# Patient Record
Sex: MALE | Race: BLACK OR AFRICAN AMERICAN | Employment: UNEMPLOYED | URBAN - METROPOLITAN AREA
[De-identification: names, ages, dates, MRNs, and addresses within clinical notes are randomized per-mention and may not be internally consistent; named-entity substitution may affect disease eponyms.]

---

## 2024-07-29 ENCOUNTER — TELEPHONE (OUTPATIENT)
Age: 29
End: 2024-07-29

## 2024-07-29 NOTE — TELEPHONE ENCOUNTER
Patient called looking to schedule surgery for a keloid on his ear, he states that his provider faxed over everything to our office, advised patient that nothing was in his chart, and made a chart for him, he stated he will call back in a few days to see if we received anything

## 2024-07-29 NOTE — TELEPHONE ENCOUNTER
We will wait for his ref. Once its received we will call him to schedule a consult. Thank you so much!

## 2024-07-31 NOTE — TELEPHONE ENCOUNTER
Received call from patient asking if we have received his referral yet.    I looked in the media tab but I did not see that anything had been scanned in.    Patient verbalized understanding

## 2024-08-05 NOTE — TELEPHONE ENCOUNTER
Rec'd call from patient again checking to see if fax was received from referring physician? I apologized and stated that it was not. Confirmed (back) fax number with patient. He states that he'll call referring office again.

## 2024-10-08 ENCOUNTER — OFFICE VISIT (OUTPATIENT)
Dept: PLASTIC SURGERY | Facility: CLINIC | Age: 29
End: 2024-10-08
Payer: COMMERCIAL

## 2024-10-08 VITALS
SYSTOLIC BLOOD PRESSURE: 136 MMHG | DIASTOLIC BLOOD PRESSURE: 96 MMHG | WEIGHT: 266.25 LBS | BODY MASS INDEX: 37.27 KG/M2 | TEMPERATURE: 97.8 F | HEART RATE: 72 BPM | HEIGHT: 71 IN

## 2024-10-08 DIAGNOSIS — L91.0 KELOID: Primary | ICD-10-CM

## 2024-10-08 PROCEDURE — 99203 OFFICE O/P NEW LOW 30 MIN: CPT | Performed by: PHYSICIAN ASSISTANT

## 2024-10-08 NOTE — PROGRESS NOTES
Consult - Plastic Surgery   Juni Kelley 29 y.o. male MRN: [id number]  Unit/Bed#:  Encounter: [id number]      Consults     Assessment:  Pt has a recurrent keloid of his right ear.  Plan:  Excision, adjacent tissue rearrangement    Principal Problem: Recurrent right ear keloid    HPI:   Juni Kelley is a 29 y.o. year old male who presents with a recurrent keloid of his right ear. He had it excised twice, approx 4 years ago and again 2 years ago.  It is beginning to cause him pain.    He is a nonsmoker, has no allergies.      REVIEW OF SYSTEMS    GENERAL/CONSTITUTIONAL: The patient denies fever, fatigue, weakness, weight gain or weight loss.  HEAD, EYES, EARS, NOSE AND THROAT: Eyes - The patient denies pain, redness, loss of vision, double or blurred vision and denies wearing glasses. The patient denies ringing in the ears, nosebleeds sinusitis, post nasal drip. Also denies frequent sore throats, hoarseness, painful swallowing.  CARDIOVASCULAR: The patient denies chest pain, irregular heartbeats, palpitations, shortness of breath, heart murmurs, high blood pressure, cramps in his legs with walking, pain in his feet or toes at night or varicose veins.  RESPIRATORY: The patient denies chronic cough, wheezing or night sweats.  GASTROINTESTINAL: The patient denies decreased appetite, nausea, vomiting, diarrhea, constipation, blood in the stools.  GENITOURINARY: The patient denies difficult urination, pain or burning with urination, blood in the urine.  MUSCULOSKELETAL: The patient denies arm, thigh or calf cramps. No joint or muscle pain. No muscle weakness or tenderness. No joint swelling, neck pain, back pain or major orthopedic injuries.  SKIN AND BREASTS: see hpi The patient denies easy bruising, skin redness, skin rash, hives.  NEUROLOGIC: The patient denies headache, dizziness, fainting, memory loss.  PSYCHIATRIC: The patient denies depression anxiety.  ENDOCRINE: The patient denies intolerance to hot or cold  "temperature, flushing, fingernail changes, increased thirst, increased salt intake or decreased sexual desire.  HEMATOLOGIC/LYMPHATIC: The patient denies anemia, bleeding tendency or clotting tendency.  ALLERGIC/IMMUNOLOGIC: The patient denies rhinitis, asthma, skin sensitivity, latex allergies or sensitivity.      Historical Information   No past medical history on file.  No past surgical history on file.  Social History   Social History     Substance and Sexual Activity   Alcohol Use Yes     Social History     Substance and Sexual Activity   Drug Use Never     Social History     Tobacco Use   Smoking Status Never    Passive exposure: Never   Smokeless Tobacco Never     Family History: No family history on file.    Meds/Allergies   No current outpatient medications on file.       Objective     /96   Pulse 72   Temp 97.8 °F (36.6 °C)   Ht 5' 11\" (1.803 m)   Wt 121 kg (266 lb 4 oz)   BMI 37.13 kg/m²   General appearance: alert and oriented, in no acute distress  Head: Normocephalic, without obvious abnormality, atraumatic  Eyes: positive findings: conjunctiva: clear  Lungs: clear to auscultation bilaterally  Heart: regular rate and rhythm  Abdomen: soft, non-tender; bowel sounds normal; no masses,  no organomegaly  Extremities: extremities normal, warm and well-perfused; no cyanosis, clubbing, or edema  Skin: pedunculated keloid originating from the right ear lobe. (See photo)  Neurologic: Alert and oriented X 3, normal strength and tone. Normal symmetric reflexes. Normal coordination and gait    Lab Results:   No results found for: \"WBC\", \"HGB\", \"HCT\", \"MCV\", \"PLT\"       No results found for: \"TISSUECULT\", \"WOUNDCULT\"      Imaging Studies:   No results found.    EKG, Pathology, and Other Studies:   No results found for: \"FINALDX\"    [unfilled]    Invasive Devices       None                   VTE Prophylaxis: Sequential compression device (Venodyne)        "

## 2024-10-15 ENCOUNTER — APPOINTMENT (OUTPATIENT)
Dept: URGENT CARE | Facility: CLINIC | Age: 29
End: 2024-10-15

## 2024-10-15 ENCOUNTER — TELEPHONE (OUTPATIENT)
Age: 29
End: 2024-10-15

## 2024-10-15 NOTE — TELEPHONE ENCOUNTER
Received call from patient to [facility] procedure.    Per Reza note patient will need Excision, adjacent tissue rearrangement.    Please call patient to [facility].

## 2024-10-16 ENCOUNTER — PREP FOR PROCEDURE (OUTPATIENT)
Dept: PLASTIC SURGERY | Facility: CLINIC | Age: 29
End: 2024-10-16

## 2024-10-16 ENCOUNTER — TELEPHONE (OUTPATIENT)
Age: 29
End: 2024-10-16

## 2024-10-16 DIAGNOSIS — L91.0 KELOID: Primary | ICD-10-CM

## 2024-10-16 NOTE — TELEPHONE ENCOUNTER
Doroteo [phone] from plastics dept called because they are going to schedule pt for surgery next month and she needs radiation after. Advised we will call patient after review

## 2024-10-18 ENCOUNTER — TELEPHONE (OUTPATIENT)
Dept: PLASTIC SURGERY | Facility: CLINIC | Age: 29
End: 2024-10-18

## 2024-10-18 NOTE — TELEPHONE ENCOUNTER
Received call from patient asking to speak with Doroteo.    I told patient that Doroteo was gone for the day and will return his call upon his return on his next sukhi day to work.    Patient verbalized understanding.

## 2024-10-23 ENCOUNTER — TELEPHONE (OUTPATIENT)
Dept: PLASTIC SURGERY | Facility: CLINIC | Age: 29
End: 2024-10-23

## 2024-10-31 ENCOUNTER — TELEPHONE (OUTPATIENT)
Age: 29
End: 2024-10-31

## 2024-10-31 NOTE — TELEPHONE ENCOUNTER
Patient called asking to change his pre op apt with Dr Garcia because he will not be able to make the scheduled apt.   My next available to offer patient is after his surgery.  If you can please call him back and assist     Thank you

## 2024-11-11 ENCOUNTER — OFFICE VISIT (OUTPATIENT)
Dept: PLASTIC SURGERY | Facility: CLINIC | Age: 29
End: 2024-11-11
Payer: COMMERCIAL

## 2024-11-11 VITALS
SYSTOLIC BLOOD PRESSURE: 125 MMHG | WEIGHT: 260 LBS | HEIGHT: 71 IN | TEMPERATURE: 97.1 F | DIASTOLIC BLOOD PRESSURE: 84 MMHG | HEART RATE: 88 BPM | BODY MASS INDEX: 36.4 KG/M2

## 2024-11-11 DIAGNOSIS — L91.0 KELOID: Primary | ICD-10-CM

## 2024-11-11 PROCEDURE — 99214 OFFICE O/P EST MOD 30 MIN: CPT | Performed by: STUDENT IN AN ORGANIZED HEALTH CARE EDUCATION/TRAINING PROGRAM

## 2024-11-11 NOTE — PROGRESS NOTES
PRS Note    HPI from 11/11/24  Patient seen and examined, presents for preop for right ear keloid excision with complex closure, local flap, possible skin graft    Patient presents today for further discussion of need for radiation therapy postop.    Patient states that he has never had steroid injection in the past for the keloiding.    He is otherwise healthy.  Non-smoker, not a diabetic, no blood thinners, not on chronic steroids.    I discussed it would be reasonable to proceed with excision and follow-up steroid injection in office. I discussed risks and complications of surgery which include infection, bleeding, scarring, possible recurrence of hypertrophic scarring/keloiding, would dehiscence, delayed wound healing.     If excision with steroid injection, fails, I then discussed that the next step would be excision followed by immediate postop radiation therapy. Patient acknowledged.    All questions answered, concerns addresed.  Will proceed with excision with complex closure, local flap, skin graft  Consent obtained  -Spent 35 minutes in consultation with patient. Greater than 50% of the total time was spent obtaining history, evaluation, performing exam, discussion of management options including post-operative care, answering patient's questions and concerns, chart reviewing, and documentation      Doe Garcia MD   [facility]   [address] [place] [address], [address]   [place] [address]   Office: [phone]      HPI from 10/8/24  Consult - Plastic Surgery   Juni Kelley 29 y.o. male MRN: [id number]  Unit/Bed#:  Encounter: [id number]        Consults     Assessment:  Pt has a recurrent keloid of his right ear.  Plan:  Excision, adjacent tissue rearrangement     Principal Problem: Recurrent right ear keloid     HPI:   Juni Kelley is a 29 y.o. year old male who presents with a recurrent keloid of his right ear. He had it excised twice, approx 4 years ago and again 2 years  ago.  It is beginning to cause him pain.     He is a nonsmoker, has no allergies.        REVIEW OF SYSTEMS     GENERAL/CONSTITUTIONAL: The patient denies fever, fatigue, weakness, weight gain or weight loss.  HEAD, EYES, EARS, NOSE AND THROAT: Eyes - The patient denies pain, redness, loss of vision, double or blurred vision and denies wearing glasses. The patient denies ringing in the ears, nosebleeds sinusitis, post nasal drip. Also denies frequent sore throats, hoarseness, painful swallowing.  CARDIOVASCULAR: The patient denies chest pain, irregular heartbeats, palpitations, shortness of breath, heart murmurs, high blood pressure, cramps in his legs with walking, pain in his feet or toes at night or varicose veins.  RESPIRATORY: The patient denies chronic cough, wheezing or night sweats.  GASTROINTESTINAL: The patient denies decreased appetite, nausea, vomiting, diarrhea, constipation, blood in the stools.  GENITOURINARY: The patient denies difficult urination, pain or burning with urination, blood in the urine.  MUSCULOSKELETAL: The patient denies arm, thigh or calf cramps. No joint or muscle pain. No muscle weakness or tenderness. No joint swelling, neck pain, back pain or major orthopedic injuries.  SKIN AND BREASTS: see hpi The patient denies easy bruising, skin redness, skin rash, hives.  NEUROLOGIC: The patient denies headache, dizziness, fainting, memory loss.  PSYCHIATRIC: The patient denies depression anxiety.  ENDOCRINE: The patient denies intolerance to hot or cold temperature, flushing, fingernail changes, increased thirst, increased salt intake or decreased sexual desire.  HEMATOLOGIC/LYMPHATIC: The patient denies anemia, bleeding tendency or clotting tendency.  ALLERGIC/IMMUNOLOGIC: The patient denies rhinitis, asthma, skin sensitivity, latex allergies or sensitivity.           Historical Information  Medical History   No past medical history on file.     Surgical History   No past surgical history  on file.           Social History  Social History          Substance and Sexual Activity   Alcohol Use Yes      Social History          Substance and Sexual Activity   Drug Use Never      Tobacco Use History   Social History           Tobacco Use   Smoking Status Never    Passive exposure: Never   Smokeless Tobacco Never         Family History:   Family History   No family history on file.              Meds/Allergies  Current Medications   No current outpatient medications on file.                  Objective

## 2024-11-15 NOTE — PRE-PROCEDURE INSTRUCTIONS
No outpatient medications have been marked as taking for the 11/19/24 encounter (Hospital Encounter).    Medication instructions for day surgery reviewed w/ pt . Pt stated peferred speaking English with no need for . Please use only a sip of water to take your instructed medications. Avoid aspirin and all over the counter vitamins, supplements and NSAIDS for one week prior to surgery per anesthesia guidelines. Tylenol is ok to take as needed.     You will receive a call one business day prior to surgery with an arrival time and hospital directions. If your surgery is scheduled on a Monday, the hospital will be calling you on the Friday prior to your surgery. If you have not heard from anyone by 8pm, please call the hospital supervisor through the hospital  at [phone].     Do not eat or drink anything after midnight the night before your surgery, including candy, mints, lifesavers, or chewing gum. Do not drink alcohol 24hrs before your surgery. Try not to smoke at least 24hrs before your surgery.       Follow the pre surgery showering instructions as listed in the “My Surgical Experience Booklet” or otherwise provided by your surgeon's office. Do not use a blade to shave the surgical area 1 week before surgery. It is okay to use a clean electric clippers up to 24 hours before surgery. Do not apply any lotions, creams, including makeup, cologne, deodorant, or perfumes after showering on the day of your surgery. Do not use dry shampoo, hair spray, hair gel, or any type of hair products.     No contact lenses, eye make-up, or artificial eyelashes. Remove nail polish, including gel polish, and any artificial, gel, or acrylic nails if possible. Remove all jewelry including rings and body piercing jewelry.     Wear causal clothing that is easy to take on and off. Consider your type of surgery.    Keep any valuables, jewelry, piercings at home. Please bring any specially ordered equipment  (sling, braces) if indicated.    Arrange for a responsible person to drive you to and from the hospital on the day of your surgery. Please confirm the visitor policy for the day of your procedure when you receive your phone call with an arrival time.     Call the surgeon's office with any new illnesses, exposures, or additional questions prior to surgery.    Please reference your “My Surgical Experience Booklet” for additional information to prepare for your upcoming surgery.

## 2024-11-19 ENCOUNTER — ANESTHESIA (OUTPATIENT)
Dept: PERIOP | Facility: HOSPITAL | Age: 29
End: 2024-11-19
Payer: COMMERCIAL

## 2024-11-19 ENCOUNTER — ANESTHESIA EVENT (OUTPATIENT)
Dept: PERIOP | Facility: HOSPITAL | Age: 29
End: 2024-11-19
Payer: COMMERCIAL

## 2024-11-19 ENCOUNTER — TELEPHONE (OUTPATIENT)
Age: 29
End: 2024-11-19

## 2024-11-19 ENCOUNTER — HOSPITAL ENCOUNTER (OUTPATIENT)
Facility: HOSPITAL | Age: 29
Setting detail: OUTPATIENT SURGERY
Discharge: HOME | End: 2024-11-19
Attending: STUDENT IN AN ORGANIZED HEALTH CARE EDUCATION/TRAINING PROGRAM | Admitting: STUDENT IN AN ORGANIZED HEALTH CARE EDUCATION/TRAINING PROGRAM
Payer: COMMERCIAL

## 2024-11-19 VITALS
DIASTOLIC BLOOD PRESSURE: 68 MMHG | SYSTOLIC BLOOD PRESSURE: 116 MMHG | OXYGEN SATURATION: 94 % | HEIGHT: 71 IN | TEMPERATURE: 97.5 F | HEART RATE: 74 BPM | WEIGHT: 266.6 LBS | BODY MASS INDEX: 37.32 KG/M2 | RESPIRATION RATE: 14 BRPM

## 2024-11-19 DIAGNOSIS — L91.0 KELOID: Primary | ICD-10-CM

## 2024-11-19 DIAGNOSIS — L91.0 KELOID: ICD-10-CM

## 2024-11-19 PROBLEM — E66.9 OBESITY (BMI 35.0-39.9 WITHOUT COMORBIDITY): Status: ACTIVE | Noted: 2024-11-19

## 2024-11-19 PROCEDURE — NC001 PR NO CHARGE: Performed by: STUDENT IN AN ORGANIZED HEALTH CARE EDUCATION/TRAINING PROGRAM

## 2024-11-19 PROCEDURE — 88305 TISSUE EXAM BY PATHOLOGIST: CPT | Performed by: STUDENT IN AN ORGANIZED HEALTH CARE EDUCATION/TRAINING PROGRAM

## 2024-11-19 PROCEDURE — 14060 TIS TRNFR E/N/E/L 10 SQ CM/<: CPT | Performed by: STUDENT IN AN ORGANIZED HEALTH CARE EDUCATION/TRAINING PROGRAM

## 2024-11-19 PROCEDURE — NC001 PR NO CHARGE: Performed by: PHYSICIAN ASSISTANT

## 2024-11-19 RX ORDER — DEXAMETHASONE SODIUM PHOSPHATE 10 MG/ML
INJECTION, SOLUTION INTRAMUSCULAR; INTRAVENOUS AS NEEDED
Status: DISCONTINUED | OUTPATIENT
Start: 2024-11-19 | End: 2024-11-19

## 2024-11-19 RX ORDER — LIDOCAINE HYDROCHLORIDE 20 MG/ML
INJECTION, SOLUTION EPIDURAL; INFILTRATION; INTRACAUDAL; PERINEURAL AS NEEDED
Status: DISCONTINUED | OUTPATIENT
Start: 2024-11-19 | End: 2024-11-19

## 2024-11-19 RX ORDER — ONDANSETRON 2 MG/ML
4 INJECTION INTRAMUSCULAR; INTRAVENOUS ONCE AS NEEDED
Status: DISCONTINUED | OUTPATIENT
Start: 2024-11-19 | End: 2024-11-19 | Stop reason: HOSPADM

## 2024-11-19 RX ORDER — LIDOCAINE HYDROCHLORIDE AND EPINEPHRINE 10; 10 MG/ML; UG/ML
INJECTION, SOLUTION INFILTRATION; PERINEURAL AS NEEDED
Status: DISCONTINUED | OUTPATIENT
Start: 2024-11-19 | End: 2024-11-19 | Stop reason: HOSPADM

## 2024-11-19 RX ORDER — SODIUM CHLORIDE, SODIUM LACTATE, POTASSIUM CHLORIDE, CALCIUM CHLORIDE 600; 310; 30; 20 MG/100ML; MG/100ML; MG/100ML; MG/100ML
125 INJECTION, SOLUTION INTRAVENOUS CONTINUOUS
Status: DISCONTINUED | OUTPATIENT
Start: 2024-11-19 | End: 2024-11-19 | Stop reason: HOSPADM

## 2024-11-19 RX ORDER — CEFAZOLIN SODIUM 2 G/50ML
2000 SOLUTION INTRAVENOUS ONCE
Status: COMPLETED | OUTPATIENT
Start: 2024-11-19 | End: 2024-11-19

## 2024-11-19 RX ORDER — MIDAZOLAM HYDROCHLORIDE 2 MG/2ML
INJECTION, SOLUTION INTRAMUSCULAR; INTRAVENOUS AS NEEDED
Status: DISCONTINUED | OUTPATIENT
Start: 2024-11-19 | End: 2024-11-19

## 2024-11-19 RX ORDER — ONDANSETRON 2 MG/ML
INJECTION INTRAMUSCULAR; INTRAVENOUS AS NEEDED
Status: DISCONTINUED | OUTPATIENT
Start: 2024-11-19 | End: 2024-11-19

## 2024-11-19 RX ORDER — PROPOFOL 10 MG/ML
INJECTION, EMULSION INTRAVENOUS AS NEEDED
Status: DISCONTINUED | OUTPATIENT
Start: 2024-11-19 | End: 2024-11-19

## 2024-11-19 RX ORDER — ROCURONIUM BROMIDE 10 MG/ML
INJECTION, SOLUTION INTRAVENOUS AS NEEDED
Status: DISCONTINUED | OUTPATIENT
Start: 2024-11-19 | End: 2024-11-19

## 2024-11-19 RX ORDER — FENTANYL CITRATE 50 UG/ML
INJECTION, SOLUTION INTRAMUSCULAR; INTRAVENOUS AS NEEDED
Status: DISCONTINUED | OUTPATIENT
Start: 2024-11-19 | End: 2024-11-19

## 2024-11-19 RX ORDER — FENTANYL CITRATE/PF 50 MCG/ML
25 SYRINGE (ML) INJECTION
Status: DISCONTINUED | OUTPATIENT
Start: 2024-11-19 | End: 2024-11-19 | Stop reason: HOSPADM

## 2024-11-19 RX ORDER — GABAPENTIN 300 MG/1
300 CAPSULE ORAL ONCE
Status: COMPLETED | OUTPATIENT
Start: 2024-11-19 | End: 2024-11-19

## 2024-11-19 RX ORDER — TRAMADOL HYDROCHLORIDE 50 MG/1
50 TABLET ORAL EVERY 6 HOURS PRN
Qty: 6 TABLET | Refills: 0 | Status: SHIPPED | OUTPATIENT
Start: 2024-11-19

## 2024-11-19 RX ORDER — ACETAMINOPHEN 325 MG/1
975 TABLET ORAL ONCE
Status: COMPLETED | OUTPATIENT
Start: 2024-11-19 | End: 2024-11-19

## 2024-11-19 RX ADMIN — ROCURONIUM BROMIDE 50 MG: 10 INJECTION, SOLUTION INTRAVENOUS at 07:34

## 2024-11-19 RX ADMIN — ACETAMINOPHEN 975 MG: 325 TABLET, FILM COATED ORAL at 06:47

## 2024-11-19 RX ADMIN — MIDAZOLAM 2 MG: 1 INJECTION INTRAMUSCULAR; INTRAVENOUS at 07:26

## 2024-11-19 RX ADMIN — SUGAMMADEX 400 MG: 100 INJECTION, SOLUTION INTRAVENOUS at 08:52

## 2024-11-19 RX ADMIN — LIDOCAINE HYDROCHLORIDE 100 MG: 20 INJECTION, SOLUTION EPIDURAL; INFILTRATION; INTRACAUDAL; PERINEURAL at 07:34

## 2024-11-19 RX ADMIN — FENTANYL CITRATE 50 MCG: 50 INJECTION, SOLUTION INTRAMUSCULAR; INTRAVENOUS at 07:34

## 2024-11-19 RX ADMIN — FENTANYL CITRATE 50 MCG: 50 INJECTION, SOLUTION INTRAMUSCULAR; INTRAVENOUS at 09:04

## 2024-11-19 RX ADMIN — CEFAZOLIN SODIUM 2000 MG: 2 SOLUTION INTRAVENOUS at 07:30

## 2024-11-19 RX ADMIN — ONDANSETRON 4 MG: 2 INJECTION INTRAMUSCULAR; INTRAVENOUS at 07:44

## 2024-11-19 RX ADMIN — SODIUM CHLORIDE, SODIUM LACTATE, POTASSIUM CHLORIDE, AND CALCIUM CHLORIDE: .6; .31; .03; .02 INJECTION, SOLUTION INTRAVENOUS at 06:30

## 2024-11-19 RX ADMIN — DEXAMETHASONE SODIUM PHOSPHATE 10 MG: 10 INJECTION, SOLUTION INTRAMUSCULAR; INTRAVENOUS at 07:44

## 2024-11-19 RX ADMIN — GABAPENTIN 300 MG: 300 CAPSULE ORAL at 06:47

## 2024-11-19 RX ADMIN — PROPOFOL 200 MG: 10 INJECTION, EMULSION INTRAVENOUS at 07:34

## 2024-11-19 NOTE — DISCHARGE INSTR - AVS FIRST PAGE
Discharge instructions    -Over-the-counter tylenol or ibuprofen for pain   -Do not get wet for 48 hours. After 48 hours, can splash wet and pat dry. Apply antibiotic ointment to incision (bacitracin). Do not submerge incisions (no baths, pools, hottubs).   -Keep incision clean and covered.  -No strenuous activity, no heavy lifting (nothing over 5 lbs), no bending over, nothing that increases heart rate as this can increase bleeding, bruising, and swelling.  -Resume regular diet and home medications  -Call Plastic Surgery office to schedule post-op follow in 10-14 days for suture removal. Monitor wound closely, as will start steroid injection if scarring becomes hypertrophic.      Doe Garcia MD   [facility]astic and Reconstruct[facility] Surgery   [address] [place] [address], [address]   [place] [address]   Office: [phone]

## 2024-11-19 NOTE — H&P
Plastic Surgery Attending    H&P reviewed, no new changes.    Doe Garcia MD   [facility]   [address] [place] [address], [address]   [place] [address]   Office: [phone]    PRS Note     HPI from 11/11/24  Patient seen and examined, presents for preop for right ear keloid excision with complex closure, local flap, possible skin graft     Patient presents today for further discussion of need for radiation therapy postop.     Patient states that he has never had steroid injection in the past for the keloiding.     He is otherwise healthy.  Non-smoker, not a diabetic, no blood thinners, not on chronic steroids.     I discussed it would be reasonable to proceed with excision and follow-up steroid injection in office. I discussed risks and complications of surgery which include infection, bleeding, scarring, possible recurrence of hypertrophic scarring/keloiding, would dehiscence, delayed wound healing.      If excision with steroid injection, fails, I then discussed that the next step would be excision followed by immediate postop radiation therapy. Patient acknowledged.     All questions answered, concerns addresed.  Will proceed with excision with complex closure, local flap, skin graft  Consent obtained  -Spent 35 minutes in consultation with patient. Greater than 50% of the total time was spent obtaining history, evaluation, performing exam, discussion of management options including post-operative care, answering patient's questions and concerns, chart reviewing, and documentation        Doe Garcia MD   [facility]   [address] [place] [address], [address]   [place] [address]   Office: [phone]        HPI from 10/8/24  Consult - Plastic Surgery   Juni Kelley 29 y.o. male MRN: [id number]  Unit/Bed#:  Encounter: [id number]        Consults     Assessment:  Pt has a recurrent keloid of his right ear.  Plan:  Excision, adjacent tissue rearrangement      Principal Problem: Recurrent right ear keloid     HPI:   Juni Kelley is a 29 y.o. year old male who presents with a recurrent keloid of his right ear. He had it excised twice, approx 4 years ago and again 2 years ago.  It is beginning to cause him pain.     He is a nonsmoker, has no allergies.        REVIEW OF SYSTEMS     GENERAL/CONSTITUTIONAL: The patient denies fever, fatigue, weakness, weight gain or weight loss.  HEAD, EYES, EARS, NOSE AND THROAT: Eyes - The patient denies pain, redness, loss of vision, double or blurred vision and denies wearing glasses. The patient denies ringing in the ears, nosebleeds sinusitis, post nasal drip. Also denies frequent sore throats, hoarseness, painful swallowing.  CARDIOVASCULAR: The patient denies chest pain, irregular heartbeats, palpitations, shortness of breath, heart murmurs, high blood pressure, cramps in his legs with walking, pain in his feet or toes at night or varicose veins.  RESPIRATORY: The patient denies chronic cough, wheezing or night sweats.  GASTROINTESTINAL: The patient denies decreased appetite, nausea, vomiting, diarrhea, constipation, blood in the stools.  GENITOURINARY: The patient denies difficult urination, pain or burning with urination, blood in the urine.  MUSCULOSKELETAL: The patient denies arm, thigh or calf cramps. No joint or muscle pain. No muscle weakness or tenderness. No joint swelling, neck pain, back pain or major orthopedic injuries.  SKIN AND BREASTS: see hpi The patient denies easy bruising, skin redness, skin rash, hives.  NEUROLOGIC: The patient denies headache, dizziness, fainting, memory loss.  PSYCHIATRIC: The patient denies depression anxiety.  ENDOCRINE: The patient denies intolerance to hot or cold temperature, flushing, fingernail changes, increased thirst, increased salt intake or decreased sexual desire.  HEMATOLOGIC/LYMPHATIC: The patient denies anemia, bleeding tendency or clotting tendency.  ALLERGIC/IMMUNOLOGIC: The  patient denies rhinitis, asthma, skin sensitivity, latex allergies or sensitivity.           Historical Information  Medical History   No past medical history on file.      Surgical History   No past surgical history on file.            Social History  Social History            Substance and Sexual Activity   Alcohol Use Yes      Social History            Substance and Sexual Activity   Drug Use Never      Tobacco Use History   Social History              Tobacco Use   Smoking Status Never    Passive exposure: Never   Smokeless Tobacco Never         Family History:   Family History   No family history on file.               Meds/Allergies  Current Medications   No current outpatient medications on file.

## 2024-11-19 NOTE — ANESTHESIA POSTPROCEDURE EVALUATION
Post-Op Assessment Note    CV Status:  Stable  Pain Score: 0    Pain management: adequate       Mental Status:  Lethargic and sleepy   Hydration Status:  Stable   PONV Controlled:  None   Airway Patency:  Patent     Post Op Vitals Reviewed: Yes    No anethesia notable event occurred.    Staff: Anesthesiologist, CRNA   Comments: vss        Last Filed PACU Vitals:  Vitals Value Taken Time   Temp 97.5    Pulse 84 11/19/24 0909   /59 11/19/24 0909   Resp 15 11/19/24 0909   SpO2 93 % 11/19/24 0909   Vitals shown include unfiled device data.    Modified Candelario:  No data recorded

## 2024-11-19 NOTE — OP NOTE
OPERATIVE REPORT  PATIENT NAME: Juni Kelley    :  1995  MRN: [id number]  Pt Location: [facility] OR ROOM 01    SURGERY DATE: 2024    Surgeons and Role:     * Doe Garcia MD - Primary    Preop Diagnosis:  Keloid [L91.0]    Post-Op Diagnosis Codes:     * Keloid [L91.0]    Procedure(s):  Excision of right ear lobe keloid (size 3.6x3.2 cm, size 11.5 cm2)  Local flap advancement for right ear closure (flap size: 2.5x2.0 cm, total flap size 5 cm2)      Specimen(s):  ID Type Source Tests Collected by Time Destination   1 : Keloid right ear 3.6 x 3.2 Tissue Soft Tissue, Other TISSUE EXAM Doe Garcia MD 2024  8:03 AM        Estimated Blood Loss:   Minimal    Drains:  * No LDAs found *    Anesthesia Type:   General    Operative Indications:  Keloid [L91.0]    Operative Findings:  Large keloid of right ear lobe, measures 3.6x3.2 cm in greatest dimension, total size 11.5 cm2  Local flap advancement for closure (flap size: 2.5x2.0 cm, total flap size 5 cm2)  Total of 4 cc of 1% lidocaine with epi    Complications:   None    Procedure and Technique:  Patient was brought to the operating room, transferred to the operating table in supine fashion. After undergoing general anesthesia, a timeout was performed at which point all patient identifiers were deemed to be correct. The right ear was prepped and draped in the normal sterile fashion. I first began by marking out the lesion. A total of 4 cc of 1% lidocaine with epi was used to locally infiltrate the area. After allowing for the local to take effect, I proceeded with excision of the right ear keloid which was sent for routine pathology. After the keloid was excised, I assessed the defect. There was significant loss of the soft tissue of the ear lobe due to the keloid excision and a open wound defect of over 2.5x2.0 cm, extending largely onto the posterior surface. Due to the size and location of the defect, complex closure was not a satisfactory option in  regards to contour. I then proceeded with designing an advancement flap from the lateral tissue. The standing cutaneous deformity was marked and excised, the advancement flap was raised and advanced into the defect. I then proceeded with closure with 3-0 vicryl for the deep subcutaneous tissue and dermis followed by 4-0 vertical mattress nylon for reapproximation of the skin. Bacitracin was applied to the incision.    This concluded the procedure. Patient tolerated the procedure well without complications. At the end of the case, all sponge, needle, and instrument counts were correct. Patient was awakened from anesthesia and taken to the PACU in stable condition.    I was present for the entire procedure, A qualified resident physician was not available and A physician assistant was required during the procedure for retraction, tissue handling, dissection and suturing         Patient Disposition:  PACU              SIGNATURE: Doe Garcia MD  DATE: November 19, 2024  TIME: 8:53 AM

## 2024-11-19 NOTE — TELEPHONE ENCOUNTER
Pt calling after surgery today with Dr Garcia, stating the medications are not at the pharmacy and he is experiencing pain.    Advised pt I see them ordered in chart but not received by pharmacy, apologized to pt    Pt would like them sent to [facility] [address] [facility] [place]    Advised per Gisell she will contact Justyn    Pt did not want to stay on line holding, he asked we call him back when to expect meds at pharmacy

## 2024-11-19 NOTE — ANESTHESIA POSTPROCEDURE EVALUATION
Post-Op Assessment Note    CV Status:  Stable    Pain management: adequate       Mental Status:  Alert and awake   Hydration Status:  Euvolemic and stable   PONV Controlled:  None   Airway Patency:  Patent     Post Op Vitals Reviewed: Yes    No anethesia notable event occurred.    Staff: Anesthesiologist           Last Filed PACU Vitals:  Vitals Value Taken Time   Temp 97.5 °F (36.4 °C) 11/19/24 0910   Pulse 76 11/19/24 1007   /68 11/19/24 1010   Resp 20 11/19/24 1007   SpO2 93 % 11/19/24 1007   Vitals shown include unfiled device data.    Modified Candelario:  Activity: 2 (11/19/2024 10:00 AM)  Respiration: 2 (11/19/2024 10:00 AM)  Circulation: 2 (11/19/2024 10:00 AM)  Consciousness: 2 (11/19/2024 10:00 AM)  Oxygen Saturation: 2 (11/19/2024 10:00 AM)  Modified Candelario Score: 10 (11/19/2024 10:00 AM)

## 2024-11-19 NOTE — ANESTHESIA PREPROCEDURE EVALUATION
Procedure:  EXCISION OF RIGHT EAR KELOID, COMPLEX CLOSURE. (Right: Ear)  ADJACENT TISSUE TRANSFER (Right: Ear)  FULL OR PARTIAL THICKNESS SKIN GRAFT. (Right: Ear)    Relevant Problems   Other   (+) Obesity (BMI 35.0-39.9 without comorbidity)        Physical Exam    Airway    Mallampati score: II  TM Distance: >3 FB  Neck ROM: full     Dental   No notable dental hx     Cardiovascular      Pulmonary      Other Findings        Anesthesia Plan  ASA Score- 2     Anesthesia Type- general with ASA Monitors.         Additional Monitors:     Airway Plan: ETT.           Plan Factors-    Chart reviewed.   Existing labs reviewed. Patient summary reviewed.    Patient is not a current smoker.              Induction- intravenous.    Postoperative Plan- Plan for postoperative opioid use.         Informed Consent- Anesthetic plan and risks discussed with patient.  I personally reviewed this patient with the CRNA. Discussed and agreed on the Anesthesia Plan with the CRNA..

## 2024-11-19 NOTE — TELEPHONE ENCOUNTER
Called and informed patient that Tramadol was sent to preferred pharmacy. Patient verbalized understanding.

## 2024-11-20 NOTE — TELEPHONE ENCOUNTER
Pt calling to move his post op with Justyn from 11/26 to the following week with Jose, pt stated he was told to do so    I do not have any openings with Dr Garcia, advised pt I will have Bao call him back en espanol with options    Please call pt back at [phone]

## 2024-11-25 PROCEDURE — 88305 TISSUE EXAM BY PATHOLOGIST: CPT | Performed by: STUDENT IN AN ORGANIZED HEALTH CARE EDUCATION/TRAINING PROGRAM

## 2024-12-04 ENCOUNTER — TELEPHONE (OUTPATIENT)
Dept: PLASTIC SURGERY | Facility: CLINIC | Age: 29
End: 2024-12-04

## 2024-12-04 ENCOUNTER — OFFICE VISIT (OUTPATIENT)
Dept: PLASTIC SURGERY | Facility: CLINIC | Age: 29
End: 2024-12-04

## 2024-12-04 DIAGNOSIS — L91.0 KELOID: Primary | ICD-10-CM

## 2024-12-04 PROCEDURE — 99024 POSTOP FOLLOW-UP VISIT: CPT

## 2024-12-04 NOTE — TELEPHONE ENCOUNTER
Geraldom to ask patient if he would liek to keep his appt for this afternoon. Dr. Garcia is in a different office but Chilo is able to give kenalog inj. Please ask patient if he calls back. Thank you.

## 2024-12-05 NOTE — PROGRESS NOTES
[facility]astic [facility]  [address], [address], [place] [address]  [phone]    Patient Identification: Juni Kelley is a 29 y.o. male     History of Present Illness: The patient is a 29 y.o.  year-old male  who presents to the office for a post-op visit. Patient is 15 days s/p Excision Of Right Ear Keloid, Complex Closure. - Right and Adjacent Tissue Transfer - Right  on 11/19/2024 by Dr. Garcia.  Patient is doing well at this time. He denies fevers, chills, signs of infection or pain. Applying Aquaphor daily.  Patient has no complaints at this time.    No past medical history on file.       Review of Systems  Constitutional: Denies fevers, chills or pain.  Skin: Denies any warmth, erythema, edema or mucopurulent drainage.     Physical Exam    R ear: Surgical incisions are clean, dry, and intact. Sutures removed. Skin perfusion is intact. Expected amount of post-operative edema. There are no signs of infection, obvious hematoma, seroma or wound dehiscence.     Procedures  Right ear cleansed with alcohol swab. 0.5cc of Kenalog 10 injected to the previous keloid site. Patient tolerated well    Assessment and Plan:  The patient is an 29 y.o.  year-old male who presents to the office for post-op visit. Patient is 15 days s/p Excision Of Right Ear Keloid, Complex Closure. - Right and Adjacent Tissue Transfer - Right  on 11/19/2024 by Dr. Garcia      -At today's visit sutures removed, area is healing well. 0.5cc of kenalog 10 injected to the previous keloid site. Pt tolerated well  -The patient is to return in 6 weeks for post-op and next injection if necessary.  -The patient is to call the office with any questions or concerns. All of the patient's questions were answered at this time and they agree with the plan of care.      Hafsa Nielsen PA-C  [facility]astic and Reconstructive Surgery

## 2025-01-09 ENCOUNTER — TELEPHONE (OUTPATIENT)
Age: 30
End: 2025-01-09

## 2025-01-09 NOTE — TELEPHONE ENCOUNTER
Tried to call both pt and spouse but both numbers did not work/were not in service. We are looking to reschedule 1/17 appt due to Andres going into surgery. Please advise.

## 2025-01-22 ENCOUNTER — OFFICE VISIT (OUTPATIENT)
Dept: PLASTIC SURGERY | Facility: CLINIC | Age: 30
End: 2025-01-22

## 2025-01-22 DIAGNOSIS — L91.0 KELOID: Primary | ICD-10-CM

## 2025-01-22 PROCEDURE — 99024 POSTOP FOLLOW-UP VISIT: CPT

## 2025-01-22 NOTE — PROGRESS NOTES
[facility] Plastic and Reconstructive Surgery  [address], [address], [place] [address]  [phone]    Patient Identification: Juni Kelley is a 29 y.o. male     History of Present Illness: The patient is a 29 y.o.  year-old male  who presents to the office for post-op visit. Patient is 64 days s/p Excision Of Right Ear Keloid, Complex Closure. - Right and Adjacent Tissue Transfer - Right  on 11/19/2024 by Dr. Garcia.  Patient is doing well at this time. Has no concerns. Tolerated keloid injection from last visit well. He is happy with his healing thus far.  Patient has no complaints at this time.    History reviewed. No pertinent past medical history.       Review of Systems  Constitutional: Denies fevers, chills or pain.  Skin: Denies any warmth, erythema, edema or mucopurulent drainage.     Physical Exam    R ear: Surgical incisions healing well. Skin perfusion is intact. No signs of hypertrophic tissue.     Procedures  Right ear cleansed with alcohol swab. 0.2cc of Kenalog 40 injected to the previous keloid site. Patient tolerated well       Assessment and Plan:  The patient is an 29 y.o.  year-old male who presents to the office for post-op visit. Patient is 64 days s/p Excision Of Right Ear Keloid, Complex Closure. - Right and Adjacent Tissue Transfer - Right  on 11/19/2024 by Dr. Garcia.      -At today's visit surgical site examined, healing well.   -0.2 cc of kenalog 40 injected to previous keloid site. Pt tolerated well.   -The patient is to return for a 3 month post-op visit. Will discuss if subsequent keloid injection needed at that time.  -The patient is to call the office with any questions or concerns. All of the patient's questions were answered at this time and they agree with the plan of care.      Hafsa Nielsen PA-C  [facility] Plastic and Reconstructive Surgery

## 2025-02-11 ENCOUNTER — TELEPHONE (OUTPATIENT)
Age: 30
End: 2025-02-11

## 2025-02-11 NOTE — TELEPHONE ENCOUNTER
Called pt regarding appt on 03/05 with Chilo in [place]. Informed pt that we need to reschedule this appt due to Chilo being in surgery this day. Patient advised that he will be calling back to reschedule this appointment. Appointment will be cancelled for now. Thank you.

## (undated) DEVICE — PLUMEPEN PRO 10FT

## (undated) DEVICE — TIBURON SPLIT SHEET: Brand: CONVERTORS

## (undated) DEVICE — PROXIMATE SKIN STAPLERS (35 WIDE) CONTAINS 35 STAINLESS STEEL STAPLES (FIXED HEAD): Brand: PROXIMATE

## (undated) DEVICE — 3M™ STERI-STRIP™ REINFORCED ADHESIVE SKIN CLOSURES, R1547, 1/2 IN X 4 IN (12 MM X 100 MM), 6 STRIPS/ENVELOPE: Brand: 3M™ STERI-STRIP™

## (undated) DEVICE — ELECTRODE NEEDLE MOD E-Z CLEAN 2.75IN 7CM -0013M

## (undated) DEVICE — PROXIMATE PLUS MD MULTI-DIRECTIONAL RELEASE SKIN STAPLERS CONTAINS 35 STAINLESS STEEL STAPLES APPROXIMATE CLOSED DIMENSIONS: 6.9MM X 3.9MM WIDE: Brand: PROXIMATE

## (undated) DEVICE — SCD SEQUENTIAL COMPRESSION COMFORT SLEEVE MEDIUM KNEE LENGTH: Brand: KENDALL SCD

## (undated) DEVICE — PACK UNIVERSAL DRAPES SUB-Q ICD

## (undated) DEVICE — DRAPE ADOLESCENT LAPAROTOMY

## (undated) DEVICE — BETHLEHEM UNIVERSAL MINOR GEN: Brand: CARDINAL HEALTH

## (undated) DEVICE — DRESSING PRISMA MATRIX 19.1IN X 19.1IN

## (undated) DEVICE — SYRINGE 10ML LL

## (undated) DEVICE — BETHLEHEM UNIVERSAL OUTPATIENT: Brand: CARDINAL HEALTH

## (undated) DEVICE — NEEDLE 25G X 1 1/2

## (undated) DEVICE — WET SKIN PREP TRAY: Brand: MEDLINE INDUSTRIES, INC.

## (undated) DEVICE — ANTIBACTERIAL UNDYED BRAIDED (POLYGLACTIN 910), SYNTHETIC ABSORBABLE SUTURE: Brand: COATED VICRYL

## (undated) DEVICE — SUT STRATAFIX SPIRAL MONOCRYL PLUS 3-0 PS-2 45CM SXMP1B107

## (undated) DEVICE — GLOVE SRG BIOGEL 6.5

## (undated) DEVICE — OCCLUSIVE GAUZE STRIP,3% BISMUTH TRIBROMOPHENATE IN PETROLATUM BLEND: Brand: XEROFORM

## (undated) DEVICE — SUT STRATAFIX SPIRAL MONOCRYL PLUS 4-0 PS-2 45CM SXMP1B118

## (undated) DEVICE — JP CHAN DRN SIL HUBLESS 15FR W/TRO: Brand: CARDINAL HEALTH

## (undated) DEVICE — CHLORAPREP HI-LITE 26ML ORANGE

## (undated) DEVICE — ELECTRODE BLADE MOD E-Z CLEAN 2.5IN 6.4CM -0012M

## (undated) DEVICE — GAUZE SPONGES,16 PLY: Brand: CURITY

## (undated) DEVICE — ABDOMINAL PAD: Brand: DERMACEA

## (undated) DEVICE — DERMATOME BLADES: Brand: DERMATOME

## (undated) DEVICE — BULB SYRINGE,IRRIGATION WITH PROTECTIVE CAP: Brand: DOVER

## (undated) DEVICE — TUBING SUCTION 5MM X 12 FT

## (undated) DEVICE — ADHESIVE SKIN CLOSR DERMABOND PRINEO

## (undated) DEVICE — GLOVE INDICATOR PI UNDERGLOVE SZ 8 BLUE

## (undated) DEVICE — 10FR FRAZIER SUCTION HANDLE: Brand: CARDINAL HEALTH

## (undated) DEVICE — PACK PBDS PLASTIC HEAD AND NECK RF

## (undated) DEVICE — INTENDED FOR TISSUE SEPARATION, AND OTHER PROCEDURES THAT REQUIRE A SHARP SURGICAL BLADE TO PUNCTURE OR CUT.: Brand: BARD-PARKER ® CARBON RIB-BACK BLADES

## (undated) DEVICE — INTENDED FOR TISSUE SEPARATION, AND OTHER PROCEDURES THAT REQUIRE A SHARP SURGICAL BLADE TO PUNCTURE OR CUT.: Brand: BARD-PARKER SAFETY BLADES SIZE 15, STERILE

## (undated) DEVICE — DRESSING OPTILOCK 6.5 X 10IN

## (undated) DEVICE — SPONGE LAP 18 X 18 IN

## (undated) DEVICE — SUT VICRYL 0 CT-1 27 IN J260H

## (undated) DEVICE — PAD GROUNDING DUAL ADULT

## (undated) DEVICE — ELECTRODE BLADE MOD E-Z CLEAN  2.75IN 7CM -0012AM

## (undated) DEVICE — SUT NYLON 5-0 P-3 18 IN 690G